# Patient Record
Sex: FEMALE | Race: ASIAN | URBAN - METROPOLITAN AREA
[De-identification: names, ages, dates, MRNs, and addresses within clinical notes are randomized per-mention and may not be internally consistent; named-entity substitution may affect disease eponyms.]

---

## 2024-05-22 ENCOUNTER — TELEPHONE (OUTPATIENT)
Dept: PULMONOLOGY | Facility: CLINIC | Age: 49
End: 2024-05-22

## 2024-05-22 NOTE — TELEPHONE ENCOUNTER
Patient is requesting to be seen as soon as possible with pulmonary within 1 to 2 months for pulmonary issue patient did not give details. The soonest date was declined please call

## 2024-05-22 NOTE — TELEPHONE ENCOUNTER
Spoke to patient-appointment scheduled 6/27/2024. Patient to bring disc of images from [facility].

## 2024-06-27 ENCOUNTER — TELEPHONE (OUTPATIENT)
Dept: PULMONOLOGY | Facility: CLINIC | Age: 49
End: 2024-06-27

## 2024-06-27 PROBLEM — F17.200 TOBACCO USE DISORDER: Status: ACTIVE | Noted: 2024-06-27

## 2024-06-28 ENCOUNTER — TELEPHONE (OUTPATIENT)
Dept: PULMONOLOGY | Facility: CLINIC | Age: 49
End: 2024-06-28

## 2024-06-28 NOTE — TELEPHONE ENCOUNTER
Current Outpatient Medications   Medication Sig Dispense Refill      10 tablet 0    fluticasone furoate-vilanterol (BREO ELLIPTA) 100-25 MCG/ACT Inhalation Aerosol Powder, Breath Activated Inhale 1 puff into the lungs daily. Rinse your mouth with water without swallowing after using BREO to help reduce your chance of getting thrush. 1 each 5     Not covered- pl call [phone]  for PA

## 2024-07-01 ENCOUNTER — TELEPHONE (OUTPATIENT)
Dept: PULMONOLOGY | Facility: CLINIC | Age: 49
End: 2024-07-01

## 2024-07-01 RX ORDER — POLYETHYLENE GLYCOL 3350 17 G
2 POWDER IN PACKET (EA) ORAL AS NEEDED
Qty: 100 LOZENGE | Refills: 5 | Status: SHIPPED | OUTPATIENT
Start: 2024-07-01

## 2024-07-01 RX ORDER — FLUTICASONE PROPIONATE AND SALMETEROL 100; 50 UG/1; UG/1
1 POWDER RESPIRATORY (INHALATION) 2 TIMES DAILY
Qty: 1 EACH | Refills: 5 | Status: SHIPPED | OUTPATIENT
Start: 2024-07-01

## 2024-07-01 NOTE — TELEPHONE ENCOUNTER
Spoke to pharmacy, Breo not covered. No alternatives given.   Spoke to patient and notified her that she will need to call insurance to see what inhalers are covered.     Follow up appointment scheduled for 8/20/24 at 2:30.    Patient also mentioned since starting Chantix she has had bad/weird dreams and headache and would like to change to lozenges instead.    Dr Escobar- please pended order for nicotine lozenge if agreeable

## 2024-07-01 NOTE — TELEPHONE ENCOUNTER
Patient called to inform Dr. Escobar that she spoke with her insurance in regards to Breo. Was told by [employer] insurance Rep the patient can be prescribed Air diskus, which may comparable to Breo. Patient would like to discuss this with Dr. Escobar or a nurse to see if they can get that prescribed instead. Please advise.     See telephone encounter 6/28.

## 2025-01-08 ENCOUNTER — TELEPHONE (OUTPATIENT)
Dept: PULMONOLOGY | Facility: CLINIC | Age: 50
End: 2025-01-08

## 2025-01-08 NOTE — TELEPHONE ENCOUNTER
NOT ON LIST  Advair Diskus 100/50 mcg (green) 60s  Inhale 1 puff into the lungs twice daily morning and evening 12 hours apart

## 2025-01-10 RX ORDER — FLUTICASONE PROPIONATE AND SALMETEROL 100; 50 UG/1; UG/1
1 POWDER RESPIRATORY (INHALATION) 2 TIMES DAILY
Qty: 1 EACH | Refills: 5 | Status: SHIPPED | OUTPATIENT
Start: 2025-01-10

## 2025-01-10 NOTE — TELEPHONE ENCOUNTER
Last refill- 7/1/24  Last appointment- 6/27/24    Dr Escobar- please sign pended refill if agreeable

## 2025-01-21 ENCOUNTER — TELEPHONE (OUTPATIENT)
Dept: PULMONOLOGY | Facility: CLINIC | Age: 50
End: 2025-01-21

## 2025-01-29 ENCOUNTER — TELEPHONE (OUTPATIENT)
Dept: PULMONOLOGY | Facility: CLINIC | Age: 50
End: 2025-01-29

## 2025-01-29 NOTE — TELEPHONE ENCOUNTER
Patient requesting script refill for Advir, patient ran out of refills. Please update [phone],thanks.

## 2025-06-20 DIAGNOSIS — R06.09 DYSPNEA ON EXERTION: Primary | ICD-10-CM

## 2025-06-20 NOTE — TELEPHONE ENCOUNTER
Current Medications[1]  luticasone-salmeterol 100-50 MCG/ACT Inhalation Aerosol Powder, Breath Activated Inhale 1 puff into the lungs 2 (two) times daily. inhale 1 puff by INHALATION route 2 times every day morning and evening approximately 12 hours apart 1 each 5          [1]   Current Outpatient Medications   Medication Sig Dispense Refill    fluticasone-salmeterol 100-50 MCG/ACT Inhalation Aerosol Powder, Breath Activated Inhale 1 puff into the lungs 2 (two) times daily. inhale 1 puff by INHALATION route 2 times every day morning and evening approximately 12 hours apart 1 each 5    nicotine polacrilex ([facility] NICOTINE) 2 MG Mouth/Throat Lozenge Place 1 lozenge (2 mg total) inside cheek as needed for Smoking cessation. 100 lozenge 5    albuterol 108 (90 Base) MCG/ACT Inhalation Aero Soln Inhale 1 puff into the lungs every 6 (six) hours as needed for Wheezing.      fluticasone propionate 50 MCG/ACT Nasal Suspension 2 sprays by Nasal route daily.      varenicline (CHANTIX) 1 MG Oral Tab Take 1 tablet (1 mg total) by mouth daily. 30 tablet 5    predniSONE 20 MG Oral Tab 2 tabs daily for 3 days then one tab daily until completed 10 tablet 0

## 2025-06-23 RX ORDER — FLUTICASONE PROPIONATE AND SALMETEROL 100; 50 UG/1; UG/1
1 POWDER RESPIRATORY (INHALATION) 2 TIMES DAILY
Qty: 1 EACH | Refills: 0 | Status: SHIPPED | OUTPATIENT
Start: 2025-06-23

## 2025-06-23 NOTE — TELEPHONE ENCOUNTER
Last seen: 6/27/24  Suggested follow up: 6-8 weeks  Next appointment: none  Last refill: 1/10/25    Left message for patient advising refill request has been received and forwarded to provider for review, however appointment is needed for further refills and to call back to schedule an appointment.     Dr. Escobar- refill pended, please review/sign if agreeable.

## 2025-07-08 ENCOUNTER — TELEPHONE (OUTPATIENT)
Dept: PULMONOLOGY | Facility: CLINIC | Age: 50
End: 2025-07-08

## 2025-07-14 DIAGNOSIS — R06.09 DYSPNEA ON EXERTION: ICD-10-CM

## 2025-07-14 RX ORDER — FLUTICASONE PROPIONATE AND SALMETEROL 100; 50 UG/1; UG/1
1 POWDER RESPIRATORY (INHALATION) 2 TIMES DAILY
Qty: 1 EACH | Refills: 5 | Status: SHIPPED | OUTPATIENT
Start: 2025-07-14

## 2025-07-14 NOTE — TELEPHONE ENCOUNTER
Last office visit 6/27/24 with recommendation to return to see MD at the 6 to 8 week interval or sooner if needed.  Last refill 6/23/25 for fluticasone-salmeterol 100-50 mcg x 1 month.   Improved

## 2025-08-02 ENCOUNTER — TELEPHONE (OUTPATIENT)
Dept: ENDOCRINOLOGY CLINIC | Facility: CLINIC | Age: 50
End: 2025-08-02